# Patient Record
Sex: FEMALE | Race: WHITE
[De-identification: names, ages, dates, MRNs, and addresses within clinical notes are randomized per-mention and may not be internally consistent; named-entity substitution may affect disease eponyms.]

---

## 2017-08-22 NOTE — ER
DATE SEEN:  08/20/2017

 

TIME SEEN:  The patient was seen at 8:30 p.m.

 

HISTORY OF PRESENT ILLNESS:  Adry is brought in by paramedics and had a cat

that became frightened as the dog intimidated it and the cat scratched her in

the face.  She has superficial claw jose c approximately 2 inch above the left

lateral temporofrontal junction brow and just superior to the brow also and just

one very superficial scratch on the upper left lateral lid and below the left

lateral orbit.  The patient's cat's RABIES shots are up-to-date.  No evidence 
for rabies

abnormality.  The patient's Tdap is in place and up-to-date also.

 

PHYSICAL EXAMINATION:  VITAL SIGNS:  Blood pressure 129/93, heart rate 82,

respirations 18, oxygen saturation 100%, and temperature is 36.8 degrees

centigrade.  GENERAL:  Cat scratch is noted as above, left lateral temple

region, left superior brow region, left upper lid, left inferior orbital region.

Wounds are superficial.  Depth is indeterminate.  Otherwise trace swelling at

the site of the cat scratch claw marks.  HEENT:  PERRLA intact.  Pharynx without

abnormality.  Eyegrounds normal appearance.  No evidence for corneal involvement

or irritation of the orbit itself.  No transection or entry through the lid

surface.  LUNGS:  Clear.  HEART:  Without murmur.  ABDOMEN:  No discomfort.

EXTREMITIES:  Without abnormality.  NEUROLOGIC:  Appropriate and alert.

 

ASSESSMENT:  Cat scratch.  Risk for Pasteurella multocida.

 

PLAN:  Treat with Augmentin 875 mg b.i.d. 20 tablets.  Follow up with doctor in

5 to 7 days or earlier if worse.  Shower and clean as needed.  Use

bacitracin/Neosporin, Triple Antibiotic ointment once or twice a day.

 

Job#: [id number]

DD: 08/21/2017 0147

DT: 08/21/2017 0437 SCOTT/CECIL CROSS

## 2020-06-25 ENCOUNTER — HOSPITAL ENCOUNTER (EMERGENCY)
Dept: HOSPITAL 7 - FB.ED | Age: 25
Discharge: HOME | End: 2020-06-25
Payer: MEDICAID

## 2020-06-25 DIAGNOSIS — R51: Primary | ICD-10-CM

## 2020-06-25 PROCEDURE — 99283 EMERGENCY DEPT VISIT LOW MDM: CPT

## 2020-06-25 PROCEDURE — 96374 THER/PROPH/DIAG INJ IV PUSH: CPT

## 2020-06-25 PROCEDURE — 96375 TX/PRO/DX INJ NEW DRUG ADDON: CPT

## 2020-06-25 NOTE — EDM.PDOC
ED HPI GENERAL MEDICAL PROBLEM





- General


Chief Complaint: Headache


Stated Complaint: MIGRAIN


Time Seen by Provider: 06/25/20 22:30


Source of Information: Reports: Patient


History Limitations: Reports: No Limitations





- History of Present Illness


INITIAL COMMENTS - FREE TEXT/NARRATIVE: 





c/o migraine





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

course





went home, ate supper, slept in recliner, awoke and had inc'd pain with nausea, 

unable to take her usual APAP/ibuprofen/Benadryl





no visual or hearing change, sat shaking on bed for one hour, had trouble 

speaking to boyfriend at one juncture





is able to move easily here in ED





says her last "migraine" was 2w ago


  ** Headache


Pain Score (Numeric/FACES): 7





- Related Data


                                    Allergies











Allergy/AdvReac Type Severity Reaction Status Date / Time


 


No Known Allergies Allergy   Verified 06/25/20 22:28











Home Meds: 


                                    Home Meds





Ondansetron [Ondansetron ODT] 4 mg PO Q6H PRN #6 tab.rapdis 06/25/20 [Rx]











Past Medical History


Neurological History: Reports: Migraines


Psychiatric History: Reports: Anxiety, Depression





Social & Family History





- Family History


Family Medical History: Noncontributory





- Tobacco Use


Smoking Status *Q: Never Smoker





- Caffeine Use


Caffeine Use: Reports: Soda





- Recreational Drug Use


Recreational Drug Use: No





ED ROS GENERAL





- Review of Systems


Review Of Systems: See Below


Constitutional: Reports: No Symptoms


HEENT: Reports: No Symptoms


Respiratory: Reports: No Symptoms


Cardiovascular: Reports: No Symptoms


Endocrine: Reports: No Symptoms


GI/Abdominal: Reports: Nausea


: Reports: No Symptoms


Musculoskeletal: Reports: No Symptoms


Skin: Reports: No Symptoms


Neurological: Reports: Headache


Psychiatric: Reports: No Symptoms


Hematologic/Lymphatic: Reports: No Symptoms


Immunologic: Reports: No Symptoms





ED EXAM, GENERAL





- Physical Exam


Exam: See Below


Exam Limited By: Other (normal speech, seats easily on bed, lights on in room, 

cognitive intact, articulate)


General Appearance: Alert, WD/WN, Mild Distress


Eye Exam: Bilateral Eye: EOMI, Normal Inspection, PERRL


Ears: Normal External Exam, Normal Canal, Hearing Grossly Normal


Nose: Normal Inspection, Normal Mucosa, No Blood


Throat/Mouth: Normal Inspection, Normal Lips, Normal Teeth, Normal Gums, Normal 

Oropharynx, Normal Voice, No Airway Compromise


Head: Atraumatic, Normocephalic


Neck: Normal Inspection, Supple, Non-Tender, Full Range of Motion, Other (no 

spasm, no point tender, occiput NT).  No: Lymphadenopathy (R), Lymphadenopathy 

(L)


Cardiovascular: Regular Rate, Rhythm, No Edema, No Murmur


GI/Abdominal: Soft, Non-Tender


Back Exam: Normal Inspection


Extremities: Normal Inspection, Normal Range of Motion, Non-Tender, No Pedal 

Edema


Neurological: Alert, Oriented, CN II-XII Intact, Normal Cognition, No 

Motor/Sensory Deficits


Psychiatric: Normal Affect, Normal Mood


Skin Exam: Warm, Dry, Intact, Normal Color, No Rash


Lymphatic: No Adenopathy





Course





- Vital Signs


Last Recorded V/S: 





                                Last Vital Signs











Temp  36.4 C   06/25/20 22:28


 


Pulse  89   06/25/20 22:28


 


Resp  16   06/25/20 22:28


 


BP  134/95 H  06/25/20 22:28


 


Pulse Ox  100   06/25/20 22:28














- Orders/Labs/Meds


Meds: 





Medications














Discontinued Medications














Generic Name Dose Route Start Last Admin





  Trade Name Freq  PRN Reason Stop Dose Admin


 


Diphenhydramine HCl  25 mg  06/25/20 22:46 





  Benadryl  IVPUSH  06/25/20 22:47 





  ONETIME ONE  


 


Ketorolac Tromethamine  30 mg  06/25/20 22:46 





  Toradol  IVPUSH  06/25/20 22:47 





  ONETIME ONE  


 


Metoclopramide HCl  10 mg  06/25/20 22:46 





  Reglan  IVPUSH  06/25/20 22:47 





  ONETIME ONE  














Departure





- Departure


Time of Disposition: 23:25


Disposition: Home, Self-Care 01


Condition: Good


Clinical Impression: 


 Occipital headache








- Discharge Information


*PRESCRIPTION DRUG MONITORING PROGRAM REVIEWED*: Not Applicable


*COPY OF PRESCRIPTION DRUG MONITORING REPORT IN PATIENT [facility]: Not Applicable


Prescriptions: 


Ondansetron [Ondansetron ODT] 4 mg PO Q6H PRN #6 tab.rapdis


 PRN Reason: Nausea


Instructions:  Migraine Headache


Referrals: 


Maia Carvajal, NP [Primary Care Provider] - 


Additional Instructions: 


Take an additional dose of ibuprofen 200 mg 3 tabs and acetaminophen 500 mg 2 

tabs in 6 hours or when you awake. May repeat every 6 hours as needed. May take 

diphenhydramine (Benadryl) 25 mg 1-2 tabs at the same time as needed.





In the future, if you have nausea, take ondansetron ODT 4 mg under the tongue 15

minutes prior to taking your migraine meds (ibuprofen plus acetaminophen plus 

diphenhydramine can be a good combination).





Get adequate rest.





Eat 3 meals a day.





See your doctor in 4-5 days for further recommendations.





Return to ED if you are feeling worse.





Sepsis Event Note (ED)





- Evaluation


Sepsis Screening Result: No Definite Risk





- Focused Exam


Vital Signs: 





                                   Vital Signs











  Temp Pulse Resp BP Pulse Ox


 


 06/25/20 22:28  36.4 C  89  16  134/95 H  100